# Patient Record
Sex: MALE | Race: WHITE | Employment: FULL TIME
[De-identification: names, ages, dates, MRNs, and addresses within clinical notes are randomized per-mention and may not be internally consistent; named-entity substitution may affect disease eponyms.]

---

## 2020-03-02 ENCOUNTER — HEALTH MAINTENANCE LETTER (OUTPATIENT)
Age: 30
End: 2020-03-02

## 2020-12-20 ENCOUNTER — HEALTH MAINTENANCE LETTER (OUTPATIENT)
Age: 30
End: 2020-12-20

## 2021-04-24 ENCOUNTER — HEALTH MAINTENANCE LETTER (OUTPATIENT)
Age: 31
End: 2021-04-24

## 2021-05-24 ENCOUNTER — CONVERSION ENCOUNTER (OUTPATIENT)
Dept: FAMILY MEDICINE CLINIC | Facility: CLINIC | Age: 31
End: 2021-05-24

## 2021-05-24 ENCOUNTER — OFFICE VISIT CONVERTED (OUTPATIENT)
Dept: FAMILY MEDICINE CLINIC | Facility: CLINIC | Age: 31
End: 2021-05-24
Attending: FAMILY MEDICINE

## 2021-05-24 ENCOUNTER — HOSPITAL ENCOUNTER (OUTPATIENT)
Dept: FAMILY MEDICINE CLINIC | Facility: CLINIC | Age: 31
Discharge: HOME | End: 2021-05-24
Attending: FAMILY MEDICINE

## 2021-05-24 LAB
ALBUMIN SERPL-MCNC: 4.6 G/DL (ref 3.5–5)
ALBUMIN/GLOB SERPL: 1.6 {RATIO} (ref 1.4–2.6)
ALP SERPL-CCNC: 82 U/L (ref 53–128)
ALT SERPL-CCNC: 26 U/L (ref 10–40)
ANION GAP SERPL CALC-SCNC: 15 MMOL/L (ref 8–19)
AST SERPL-CCNC: 21 U/L (ref 15–50)
BASOPHILS # BLD AUTO: 0.08 10*3/UL (ref 0–0.2)
BASOPHILS NFR BLD AUTO: 1.1 % (ref 0–3)
BILIRUB SERPL-MCNC: 0.65 MG/DL (ref 0.2–1.3)
BUN SERPL-MCNC: 15 MG/DL (ref 5–25)
BUN/CREAT SERPL: 14 {RATIO} (ref 6–20)
CALCIUM SERPL-MCNC: 9.2 MG/DL (ref 8.7–10.4)
CHLORIDE SERPL-SCNC: 105 MMOL/L (ref 99–111)
CHOLEST SERPL-MCNC: 231 MG/DL (ref 107–200)
CHOLEST/HDLC SERPL: 6.4 {RATIO} (ref 3–6)
CONV ABS IMM GRAN: 0.03 10*3/UL (ref 0–0.2)
CONV CO2: 26 MMOL/L (ref 22–32)
CONV IMMATURE GRAN: 0.4 % (ref 0–1.8)
CONV TOTAL PROTEIN: 7.5 G/DL (ref 6.3–8.2)
CREAT UR-MCNC: 1.1 MG/DL (ref 0.7–1.2)
DEPRECATED RDW RBC AUTO: 41.3 FL (ref 35.1–43.9)
EOSINOPHIL # BLD AUTO: 0.15 10*3/UL (ref 0–0.7)
EOSINOPHIL # BLD AUTO: 2.1 % (ref 0–7)
ERYTHROCYTE [DISTWIDTH] IN BLOOD BY AUTOMATED COUNT: 12.8 % (ref 11.6–14.4)
GFR SERPLBLD BASED ON 1.73 SQ M-ARVRAT: >60 ML/MIN/{1.73_M2}
GLOBULIN UR ELPH-MCNC: 2.9 G/DL (ref 2–3.5)
GLUCOSE SERPL-MCNC: 97 MG/DL (ref 70–99)
HCT VFR BLD AUTO: 48.3 % (ref 42–52)
HDLC SERPL-MCNC: 36 MG/DL (ref 40–60)
HGB BLD-MCNC: 15.7 G/DL (ref 14–18)
INTERNATIONAL NORMALIZED RATIO (INR) RANGE: NORMAL
LDLC SERPL CALC-MCNC: 169 MG/DL (ref 70–100)
LYMPHOCYTES # BLD AUTO: 2.4 10*3/UL (ref 1–5)
LYMPHOCYTES NFR BLD AUTO: 33.5 % (ref 20–45)
MCH RBC QN AUTO: 28.9 PG (ref 27–31)
MCHC RBC AUTO-ENTMCNC: 32.5 G/DL (ref 33–37)
MCV RBC AUTO: 88.8 FL (ref 80–96)
MONOCYTES # BLD AUTO: 0.67 10*3/UL (ref 0.2–1.2)
MONOCYTES NFR BLD AUTO: 9.3 % (ref 3–10)
NEUTROPHILS # BLD AUTO: 3.84 10*3/UL (ref 2–8)
NEUTROPHILS NFR BLD AUTO: 53.6 % (ref 30–85)
NRBC CBCN: 0 % (ref 0–0.7)
OSMOLALITY SERPL CALC.SUM OF ELEC: 293 MOSM/KG (ref 273–304)
PLATELET # BLD AUTO: 269 10*3/UL (ref 130–400)
PMV BLD AUTO: 10.2 FL (ref 9.4–12.4)
POTASSIUM SERPL-SCNC: 4.5 MMOL/L (ref 3.5–5.3)
RBC # BLD AUTO: 5.44 10*6/UL (ref 4.7–6.1)
SODIUM SERPL-SCNC: 141 MMOL/L (ref 135–147)
TRIGL SERPL-MCNC: 129 MG/DL (ref 40–150)
VLDLC SERPL-MCNC: 26 MG/DL (ref 5–37)
WBC # BLD AUTO: 7.17 10*3/UL (ref 4.8–10.8)
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
## 2021-07-15 VITALS
TEMPERATURE: 97.7 F | HEART RATE: 79 BPM | HEIGHT: 75 IN | DIASTOLIC BLOOD PRESSURE: 78 MMHG | WEIGHT: 315 LBS | OXYGEN SATURATION: 96 % | BODY MASS INDEX: 39.17 KG/M2 | RESPIRATION RATE: 12 BRPM | SYSTOLIC BLOOD PRESSURE: 110 MMHG

## 2021-10-03 ENCOUNTER — HEALTH MAINTENANCE LETTER (OUTPATIENT)
Age: 31
End: 2021-10-03

## 2022-05-15 ENCOUNTER — HEALTH MAINTENANCE LETTER (OUTPATIENT)
Age: 32
End: 2022-05-15

## 2022-06-10 RX ORDER — WARFARIN SODIUM 1 MG/1
TABLET ORAL
Qty: 30 TABLET | OUTPATIENT
Start: 2022-06-10

## 2022-09-10 ENCOUNTER — HEALTH MAINTENANCE LETTER (OUTPATIENT)
Age: 32
End: 2022-09-10

## 2023-02-14 ENCOUNTER — TRANSCRIBE ORDERS (OUTPATIENT)
Dept: ADMINISTRATIVE | Facility: HOSPITAL | Age: 33
End: 2023-02-14
Payer: COMMERCIAL

## 2023-02-14 ENCOUNTER — LAB (OUTPATIENT)
Dept: LAB | Facility: HOSPITAL | Age: 33
End: 2023-02-14
Payer: COMMERCIAL

## 2023-02-14 DIAGNOSIS — D68.2 HEREDITARY COAGULATION FACTOR DEFICIENCY: ICD-10-CM

## 2023-02-14 DIAGNOSIS — Z79.01 LONG TERM (CURRENT) USE OF ANTICOAGULANTS: ICD-10-CM

## 2023-02-14 DIAGNOSIS — Z79.01 LONG TERM (CURRENT) USE OF ANTICOAGULANTS: Primary | ICD-10-CM

## 2023-02-14 LAB
INR PPP: 1.77 (ref 0.86–1.15)
PROTHROMBIN TIME: 21 SECONDS (ref 11.8–14.9)

## 2023-02-14 PROCEDURE — 85610 PROTHROMBIN TIME: CPT

## 2023-02-14 PROCEDURE — 36415 COLL VENOUS BLD VENIPUNCTURE: CPT

## 2023-03-01 ENCOUNTER — TRANSCRIBE ORDERS (OUTPATIENT)
Dept: ADMINISTRATIVE | Facility: HOSPITAL | Age: 33
End: 2023-03-01
Payer: COMMERCIAL

## 2023-03-01 ENCOUNTER — LAB (OUTPATIENT)
Dept: LAB | Facility: HOSPITAL | Age: 33
End: 2023-03-01
Payer: COMMERCIAL

## 2023-03-01 DIAGNOSIS — Z79.01 LONG TERM (CURRENT) USE OF ANTICOAGULANTS: Primary | ICD-10-CM

## 2023-03-01 DIAGNOSIS — Z79.01 LONG TERM (CURRENT) USE OF ANTICOAGULANTS: ICD-10-CM

## 2023-03-01 DIAGNOSIS — D68.2 HEREDITARY COAGULATION FACTOR DEFICIENCY: ICD-10-CM

## 2023-03-01 LAB
INR PPP: 1.42 (ref 0.86–1.15)
PROTHROMBIN TIME: 17.3 SECONDS (ref 11.8–14.9)

## 2023-03-01 PROCEDURE — 85610 PROTHROMBIN TIME: CPT

## 2023-03-01 PROCEDURE — 36415 COLL VENOUS BLD VENIPUNCTURE: CPT

## 2023-03-09 ENCOUNTER — LAB (OUTPATIENT)
Dept: LAB | Facility: HOSPITAL | Age: 33
End: 2023-03-09
Payer: COMMERCIAL

## 2023-03-09 DIAGNOSIS — Z79.01 LONG TERM (CURRENT) USE OF ANTICOAGULANTS: ICD-10-CM

## 2023-03-09 DIAGNOSIS — D68.2 HEREDITARY COAGULATION FACTOR DEFICIENCY: ICD-10-CM

## 2023-03-09 LAB
INR PPP: 2.19 (ref 0.86–1.15)
PROTHROMBIN TIME: 24.1 SECONDS (ref 11.8–14.9)

## 2023-03-09 PROCEDURE — 85610 PROTHROMBIN TIME: CPT

## 2023-03-09 PROCEDURE — 36415 COLL VENOUS BLD VENIPUNCTURE: CPT

## 2023-03-16 ENCOUNTER — LAB (OUTPATIENT)
Dept: LAB | Facility: HOSPITAL | Age: 33
End: 2023-03-16
Payer: COMMERCIAL

## 2023-03-16 DIAGNOSIS — Z79.01 LONG TERM (CURRENT) USE OF ANTICOAGULANTS: ICD-10-CM

## 2023-03-16 DIAGNOSIS — D68.2 HEREDITARY COAGULATION FACTOR DEFICIENCY: ICD-10-CM

## 2023-03-16 LAB
INR PPP: 1.85 (ref 0.86–1.15)
PROTHROMBIN TIME: 21.2 SECONDS (ref 11.8–14.9)

## 2023-03-16 PROCEDURE — 85610 PROTHROMBIN TIME: CPT

## 2023-03-16 PROCEDURE — 36415 COLL VENOUS BLD VENIPUNCTURE: CPT

## 2023-03-24 ENCOUNTER — LAB (OUTPATIENT)
Dept: LAB | Facility: HOSPITAL | Age: 33
End: 2023-03-24
Payer: COMMERCIAL

## 2023-03-24 DIAGNOSIS — D68.2 HEREDITARY COAGULATION FACTOR DEFICIENCY: ICD-10-CM

## 2023-03-24 DIAGNOSIS — Z79.01 LONG TERM (CURRENT) USE OF ANTICOAGULANTS: ICD-10-CM

## 2023-03-24 LAB
INR PPP: 2.51 (ref 0.86–1.15)
PROTHROMBIN TIME: 26.7 SECONDS (ref 11.8–14.9)

## 2023-03-24 PROCEDURE — 36415 COLL VENOUS BLD VENIPUNCTURE: CPT

## 2023-03-24 PROCEDURE — 85610 PROTHROMBIN TIME: CPT

## 2023-04-07 ENCOUNTER — LAB (OUTPATIENT)
Dept: LAB | Facility: HOSPITAL | Age: 33
End: 2023-04-07
Payer: COMMERCIAL

## 2023-04-07 ENCOUNTER — TRANSCRIBE ORDERS (OUTPATIENT)
Dept: ADMINISTRATIVE | Facility: HOSPITAL | Age: 33
End: 2023-04-07
Payer: COMMERCIAL

## 2023-04-07 DIAGNOSIS — D68.2 HEREDITARY COAGULATION FACTOR DEFICIENCY: ICD-10-CM

## 2023-04-07 DIAGNOSIS — Z79.01 LONG TERM (CURRENT) USE OF ANTICOAGULANTS: Primary | ICD-10-CM

## 2023-04-07 DIAGNOSIS — Z79.01 LONG TERM (CURRENT) USE OF ANTICOAGULANTS: ICD-10-CM

## 2023-04-07 LAB
INR PPP: 2.43 (ref 0.86–1.15)
PROTHROMBIN TIME: 26.1 SECONDS (ref 11.8–14.9)

## 2023-04-07 PROCEDURE — 85610 PROTHROMBIN TIME: CPT

## 2023-04-07 PROCEDURE — 36415 COLL VENOUS BLD VENIPUNCTURE: CPT

## 2023-06-03 ENCOUNTER — HEALTH MAINTENANCE LETTER (OUTPATIENT)
Age: 33
End: 2023-06-03

## 2023-10-19 ENCOUNTER — OFFICE VISIT (OUTPATIENT)
Dept: FAMILY MEDICINE CLINIC | Facility: CLINIC | Age: 33
End: 2023-10-19
Payer: COMMERCIAL

## 2023-10-19 VITALS
OXYGEN SATURATION: 97 % | BODY MASS INDEX: 39.17 KG/M2 | WEIGHT: 315 LBS | SYSTOLIC BLOOD PRESSURE: 110 MMHG | TEMPERATURE: 97.5 F | DIASTOLIC BLOOD PRESSURE: 80 MMHG | HEART RATE: 82 BPM | HEIGHT: 75 IN

## 2023-10-19 DIAGNOSIS — Z30.09 VASECTOMY EVALUATION: ICD-10-CM

## 2023-10-19 DIAGNOSIS — R19.7 DIARRHEA, UNSPECIFIED TYPE: ICD-10-CM

## 2023-10-19 DIAGNOSIS — R22.30 NODULE OF SKIN OF HAND: Primary | ICD-10-CM

## 2023-10-19 PROBLEM — Z79.01 CURRENT USE OF LONG TERM ANTICOAGULATION: Status: ACTIVE | Noted: 2021-06-30

## 2023-10-19 PROBLEM — D68.51 FACTOR 5 LEIDEN MUTATION, HETEROZYGOUS: Status: ACTIVE | Noted: 2023-10-19

## 2023-10-19 PROBLEM — S62.109A FRACTURE OF CARPAL BONE: Status: ACTIVE | Noted: 2023-10-19

## 2023-10-19 PROBLEM — S72.009A HIP FRACTURE: Status: ACTIVE | Noted: 2023-10-19

## 2023-10-19 PROBLEM — I26.99 PULMONARY EMBOLISM: Status: ACTIVE | Noted: 2023-10-19

## 2023-10-19 PROCEDURE — 86258 DGP ANTIBODY EACH IG CLASS: CPT | Performed by: FAMILY MEDICINE

## 2023-10-19 PROCEDURE — 86364 TISS TRNSGLTMNASE EA IG CLAS: CPT | Performed by: FAMILY MEDICINE

## 2023-10-19 PROCEDURE — 82784 ASSAY IGA/IGD/IGG/IGM EACH: CPT | Performed by: FAMILY MEDICINE

## 2023-10-19 RX ORDER — WARFARIN SODIUM 1 MG/1
TABLET ORAL SEE ADMIN INSTRUCTIONS
COMMUNITY
Start: 2021-05-24

## 2023-10-19 RX ORDER — WARFARIN SODIUM 10 MG/1
10 TABLET ORAL DAILY
COMMUNITY
Start: 2021-05-24

## 2023-10-19 NOTE — PROGRESS NOTES
"Chief Complaint  Hand Pain (Knot on hand ) and Sterilization (Pt would like referral for vasectomy)    Subjective          Morris Pham presents to [facility]  History of Present Illness  Pt has nodule right hand x 2 months- has some pain with movement- nodule is between #3,4 metacarpals    Pt has some food allergies- has intermittent diarrhea after certain foods- pt has had symptoms more than 1 yr    Pt needs vasectomy             Objective   No Known Allergies  Immunization History   Administered Date(s) Administered    COVID-19 (PFIZER) Purple Cap Monovalent 04/14/2021, 05/05/2021     History reviewed. No pertinent past medical history.   History reviewed. No pertinent surgical history.   Social History     Socioeconomic History    Marital status:    Tobacco Use    Smoking status: Never    Smokeless tobacco: Never   Vaping Use    Vaping Use: Never used   Substance and Sexual Activity    Alcohol use: Never    Drug use: Never    Sexual activity: Defer        Current Outpatient Medications:     warfarin (COUMADIN) 1 MG tablet, See Admin Instructions., Disp: , Rfl:     warfarin (COUMADIN) 10 MG tablet, Take 1 tablet by mouth Daily., Disp: , Rfl:    History reviewed. No pertinent family history.       Vital Signs:   Vitals:    10/19/23 0827   BP: 110/80   Pulse: 82   Temp: 97.5 °F (36.4 °C)   SpO2: 97%   Weight: (!) 152 kg (336 lb)   Height: 189.2 cm (74.5\")       Review of Systems   Physical Exam  Vitals reviewed.   Constitutional:       Appearance: Normal appearance. He is well-developed.   HENT:      Head: Normocephalic and atraumatic.      Right Ear: External ear normal.      Left Ear: External ear normal.      Mouth/Throat:      Pharynx: No oropharyngeal exudate.   Eyes:      Conjunctiva/sclera: Conjunctivae normal.      Pupils: Pupils are equal, round, and reactive to light.   Cardiovascular:      Rate and Rhythm: Normal rate and regular rhythm.      Pulses: Normal " pulses.      Heart sounds: Normal heart sounds. No murmur heard.     No friction rub. No gallop.   Pulmonary:      Effort: Pulmonary effort is normal.      Breath sounds: Normal breath sounds. No wheezing or rhonchi.   Abdominal:      General: Abdomen is flat. Bowel sounds are normal. There is no distension.      Palpations: Abdomen is soft. There is no mass.      Tenderness: There is no abdominal tenderness. There is no guarding or rebound.      Hernia: No hernia is present.   Musculoskeletal:         General: Normal range of motion.      Comments: Right hand 4-5mm soft, moveable, non-tender nodule in palm of hand between metacarpal #3,4, no redness or warmth.   Skin:     General: Skin is warm and dry.      Capillary Refill: Capillary refill takes less than 2 seconds.   Neurological:      General: No focal deficit present.      Mental Status: He is alert and oriented to person, place, and time.      Cranial Nerves: No cranial nerve deficit.   Psychiatric:         Mood and Affect: Mood and affect normal.         Behavior: Behavior normal.         Thought Content: Thought content normal.         Judgment: Judgment normal.        Result Review :   The following data was reviewed by: Elliott Vidal MD on 10/19/2023:    CBC          2/8/2023    16:20   CBC   WBC 8.71       RBC 5.19       Hemoglobin 15.4       Hematocrit 45.6       MCV 87.9       MCH 29.7       MCHC 33.8       RDW 13.0       Platelets 256          Details          This result is from an external source.                 Data reviewed : Radiologic studies I viewed and interpreted 3 views right hand x-rays:no fxs           Assessment and Plan    Diagnoses and all orders for this visit:    1. Nodule of skin of hand (Primary)  -     XR Hand 3+ View Right (In Office)  -     Ambulatory Referral to Hand Surgery    2. Diarrhea, unspecified type  -     Celiac Disease Antibody Screen  -     Ambulatory Referral to Allergy  -     Ambulatory Referral to  Gastroenterology    3. Vasectomy evaluation  -     Ambulatory Referral to Urology            Follow Up   Return if symptoms worsen or fail to improve.  Patient was given instructions and counseling regarding his condition or for health maintenance advice. Please see specific information pulled into the AVS if appropriate.

## 2023-10-19 NOTE — PROGRESS NOTES
Venipuncture Blood Specimen Collection  Venipuncture performed in left arm by Shade Abdalla with good hemostasis. Patient tolerated the procedure well without complications.   10/19/23   Shade Abdalla

## 2023-10-20 LAB
GLIADIN PEPTIDE IGA SER-ACNC: 4 UNITS (ref 0–19)
IGA SERPL-MCNC: 79 MG/DL (ref 90–386)
TTG IGA SER-ACNC: <2 U/ML (ref 0–3)

## 2024-03-18 ENCOUNTER — OFFICE VISIT (OUTPATIENT)
Dept: FAMILY MEDICINE CLINIC | Facility: CLINIC | Age: 34
End: 2024-03-18
Payer: COMMERCIAL

## 2024-03-18 VITALS
OXYGEN SATURATION: 97 % | TEMPERATURE: 97.1 F | DIASTOLIC BLOOD PRESSURE: 90 MMHG | SYSTOLIC BLOOD PRESSURE: 150 MMHG | BODY MASS INDEX: 43.47 KG/M2 | HEART RATE: 79 BPM | WEIGHT: 315 LBS

## 2024-03-18 DIAGNOSIS — R16.0 LIVER MASS, RIGHT LOBE: Primary | ICD-10-CM

## 2024-03-18 DIAGNOSIS — R19.7 DIARRHEA, UNSPECIFIED TYPE: ICD-10-CM

## 2024-03-18 DIAGNOSIS — K76.0 FATTY LIVER: ICD-10-CM

## 2024-03-18 DIAGNOSIS — E78.2 MIXED HYPERLIPIDEMIA: ICD-10-CM

## 2024-03-18 DIAGNOSIS — I10 PRIMARY HYPERTENSION: ICD-10-CM

## 2024-03-18 PROCEDURE — 99214 OFFICE O/P EST MOD 30 MIN: CPT | Performed by: FAMILY MEDICINE

## 2024-03-18 RX ORDER — LISINOPRIL 10 MG/1
10 TABLET ORAL DAILY
Qty: 30 TABLET | Refills: 1 | Status: SHIPPED | OUTPATIENT
Start: 2024-03-18 | End: 2024-03-19

## 2024-03-18 NOTE — PROGRESS NOTES
Chief Complaint  Annual Exam, Factor 5, and Fatty Liver     Subjective          Morris Pham presents to [facility]  History of Present Illness  Pt had U/S recently that showed right complex liver mass and fatty liver- will need MRI of liver  Pt has chronic diarrhea  Hypertension  This is a new problem. The current episode started today. The problem is unchanged. The problem is uncontrolled. Pertinent negatives include no anxiety, blurred vision, chest pain, headaches, malaise/fatigue, neck pain, orthopnea, palpitations, peripheral edema, PND, shortness of breath or sweats. There are no associated agents to hypertension. Risk factors for coronary artery disease include male gender and family history. Current antihypertension treatment includes ACE inhibitors. The current treatment provides no improvement. There are no compliance problems.               Objective   No Known Allergies  Immunization History   Administered Date(s) Administered    COVID-19 (PFIZER) Purple Cap Monovalent 04/14/2021, 05/05/2021     History reviewed. No pertinent past medical history.   History reviewed. No pertinent surgical history.   Social History     Socioeconomic History    Marital status:    Tobacco Use    Smoking status: Never    Smokeless tobacco: Never   Vaping Use    Vaping status: Never Used   Substance and Sexual Activity    Alcohol use: Never    Drug use: Never    Sexual activity: Defer        Current Outpatient Medications:     warfarin (COUMADIN) 1 MG tablet, See Admin Instructions., Disp: , Rfl:     warfarin (COUMADIN) 10 MG tablet, Take 1 tablet by mouth Daily., Disp: , Rfl:     lisinopril (PRINIVIL,ZESTRIL) 10 MG tablet, Take 1 tablet by mouth Daily., Disp: 30 tablet, Rfl: 1   History reviewed. No pertinent family history.       Vital Signs:   Vitals:    03/18/24 1631   BP: 150/90   Pulse: 79   Temp: 97.1 °F (36.2 °C)   SpO2: 97%   Weight: (!) 156 kg (343 lb 3.2 oz)       Review  of Systems   Constitutional:  Negative for fatigue, fever and malaise/fatigue.   HENT:  Negative for sore throat.    Eyes:  Negative for blurred vision and visual disturbance.   Respiratory:  Negative for cough, chest tightness, shortness of breath and wheezing.    Cardiovascular:  Negative for chest pain, palpitations, orthopnea, leg swelling and PND.   Gastrointestinal:  Negative for abdominal pain, diarrhea, nausea and vomiting.   Musculoskeletal:  Negative for neck pain.   Neurological:  Negative for light-headedness and headaches.      Physical Exam  Vitals reviewed.   Constitutional:       Appearance: Normal appearance. He is well-developed.   HENT:      Head: Normocephalic and atraumatic.      Right Ear: External ear normal.      Left Ear: External ear normal.      Mouth/Throat:      Pharynx: No oropharyngeal exudate.   Eyes:      Conjunctiva/sclera: Conjunctivae normal.      Pupils: Pupils are equal, round, and reactive to light.   Cardiovascular:      Rate and Rhythm: Normal rate and regular rhythm.      Pulses: Normal pulses.      Heart sounds: Normal heart sounds. No murmur heard.     No friction rub. No gallop.   Pulmonary:      Effort: Pulmonary effort is normal.      Breath sounds: Normal breath sounds. No wheezing or rhonchi.   Abdominal:      General: Abdomen is flat. Bowel sounds are normal. There is no distension.      Palpations: Abdomen is soft. There is no mass.      Tenderness: There is no abdominal tenderness. There is no guarding or rebound.      Hernia: No hernia is present.   Musculoskeletal:         General: Normal range of motion.   Skin:     General: Skin is warm and dry.      Capillary Refill: Capillary refill takes less than 2 seconds.   Neurological:      General: No focal deficit present.      Mental Status: He is alert and oriented to person, place, and time.      Cranial Nerves: No cranial nerve deficit.   Psychiatric:         Mood and Affect: Mood and affect normal.          Behavior: Behavior normal.         Thought Content: Thought content normal.         Judgment: Judgment normal.        Result Review :   The following data was reviewed by: Elliott Vidal MD on 03/18/2024:    CBC          11/8/2023    16:15   CBC   WBC 8.52       RBC 5.19       Hemoglobin 15.4       Hematocrit 44.8       MCV 86.3       MCH 29.7       MCHC 34.4       RDW Test Not Performed       Platelets 262          Details          This result is from an external source.                           Assessment and Plan    Diagnoses and all orders for this visit:    1. Liver mass, right lobe (Primary)  -     Ambulatory Referral to Gastroenterology  -     MRI Abdomen With & Without Contrast; Future    2. Diarrhea, unspecified type  -     Ambulatory Referral to Gastroenterology    3. Fatty liver  -     Ambulatory Referral to Gastroenterology    4. Mixed hyperlipidemia    5. Primary hypertension  -     CBC Auto Differential  -     Comprehensive Metabolic Panel  -     Lipid Panel  -     TSH+Free T4  -     lisinopril (PRINIVIL,ZESTRIL) 10 MG tablet; Take 1 tablet by mouth Daily.  Dispense: 30 tablet; Refill: 1            Follow Up   Return in about 3 weeks (around 4/8/2024) for Recheck.  Patient was given instructions and counseling regarding his condition or for health maintenance advice. Please see specific information pulled into the AVS if appropriate.

## 2024-03-19 RX ORDER — LISINOPRIL 10 MG/1
10 TABLET ORAL DAILY
Qty: 90 TABLET | Refills: 0 | Status: SHIPPED | OUTPATIENT
Start: 2024-03-19

## 2024-03-21 ENCOUNTER — TELEPHONE (OUTPATIENT)
Dept: FAMILY MEDICINE CLINIC | Facility: CLINIC | Age: 34
End: 2024-03-21

## 2024-03-21 NOTE — TELEPHONE ENCOUNTER
Caller: Morris Pham    Relationship: Self    Best call back number: [phone]     What is the best time to reach you: ANYTIME     Who are you requesting to speak with (clinical staff, provider,  specific staff member): CLINICAL     What was the call regarding: PATIENT IS CALLING IN REGARDS TO FASTING LABS, THAT HE WAS THINKING WAS TO BE ORDERED BY PCP, TO HAVE COMPLETED ASAP.

## 2024-03-26 ENCOUNTER — OFFICE VISIT (OUTPATIENT)
Dept: GASTROENTEROLOGY | Facility: CLINIC | Age: 34
End: 2024-03-26
Payer: COMMERCIAL

## 2024-03-26 VITALS
WEIGHT: 315 LBS | BODY MASS INDEX: 40.43 KG/M2 | HEIGHT: 74 IN | SYSTOLIC BLOOD PRESSURE: 136 MMHG | DIASTOLIC BLOOD PRESSURE: 84 MMHG | HEART RATE: 63 BPM | OXYGEN SATURATION: 100 %

## 2024-03-26 DIAGNOSIS — K76.9 LIVER LESION: Primary | ICD-10-CM

## 2024-03-26 DIAGNOSIS — K76.0 FATTY LIVER: ICD-10-CM

## 2024-03-26 DIAGNOSIS — R19.7 DIARRHEA, UNSPECIFIED TYPE: ICD-10-CM

## 2024-03-26 DIAGNOSIS — E66.01 CLASS 3 SEVERE OBESITY WITH BODY MASS INDEX (BMI) OF 40.0 TO 44.9 IN ADULT, UNSPECIFIED OBESITY TYPE, UNSPECIFIED WHETHER SERIOUS COMORBIDITY PRESENT: ICD-10-CM

## 2024-03-26 PROCEDURE — 99204 OFFICE O/P NEW MOD 45 MIN: CPT

## 2024-03-26 RX ORDER — SODIUM, POTASSIUM,MAG SULFATES 17.5-3.13G
1 SOLUTION, RECONSTITUTED, ORAL ORAL EVERY 12 HOURS
Qty: 354 ML | Refills: 0 | Status: SHIPPED | OUTPATIENT
Start: 2024-03-26

## 2024-03-26 NOTE — PROGRESS NOTES
"Chief Complaint  Fatty liver and Diarrhea    Morris Pham is a 33 y.o. male who presents to [facility] on referral from Elliott Vidal MD for a gastroenterology evaluation of fatty liver and diarrhea.      History of Present Illness  New patient presents to the office for fatty liver, liver lesion, and diarrhea. Patient had ultrasound at work showing fatty liver with liver lesion, follow up MRI ordered by PCP. Denies RUQ pain, alcohol use, IV drug use, unprofessional tattoos, history of or exposure to hepatitis, history of blood transfusions, and family history of liver disease.  He does struggle with intermittent diarrhea, has noticed some certain food triggers.  Has diarrhea 50-60% of the time, will have 1-2 episodes in a day.  Denies abdominal pain, melena, and hematochezia.  Denies upper GI symptoms.      History reviewed. No pertinent past medical history.    History reviewed. No pertinent surgical history.      Current Outpatient Medications:     warfarin (COUMADIN) 1 MG tablet, See Admin Instructions., Disp: , Rfl:     warfarin (COUMADIN) 10 MG tablet, Take 1 tablet by mouth Daily., Disp: , Rfl:     lisinopril (PRINIVIL,ZESTRIL) 10 MG tablet, TAKE 1 TABLET BY MOUTH DAILY (Patient not taking: Reported on 3/26/2024), Disp: 90 tablet, Rfl: 0    sodium-potassium-magnesium sulfates (SUPREP) 17.5-3.13-1.6 GM/177ML solution oral solution, Take 1 bottle by mouth Every 12 (Twelve) Hours., Disp: 354 mL, Rfl: 0     No Known Allergies    Family History   Problem Relation Age of Onset    Colon cancer Neg Hx         Social History     Social History Narrative    Not on file       Immunization:  Immunization History   Administered Date(s) Administered    COVID-19 (PFIZER) Purple Cap Monovalent 04/14/2021, 05/05/2021        Objective     Vital Signs:   /84 (BP Location: Right arm, Patient Position: Sitting, Cuff Size: Adult)   Pulse 63   Ht 189.2 cm (74.49\")   Wt " (!) 153 kg (338 lb)   SpO2 100%   BMI 42.83 kg/m²       Physical Exam  Constitutional:       Appearance: Normal appearance. He is normal weight.   HENT:      Head: Normocephalic and atraumatic.      Nose: Nose normal.   Pulmonary:      Effort: Pulmonary effort is normal.   Skin:     General: Skin is warm and dry.   Neurological:      Mental Status: He is alert and oriented to person, place, and time. Mental status is at baseline.   Psychiatric:         Mood and Affect: Mood normal.         Behavior: Behavior normal.         Thought Content: Thought content normal.         Judgment: Judgment normal.         Result Review :                     Assessment and Plan    Diagnoses and all orders for this visit:    1. Liver lesion (Primary)  -     AFP Tumor Marker; Future    2. Fatty liver    3. Class 3 severe obesity with body mass index (BMI) of 40.0 to 44.9 in adult, unspecified obesity type, unspecified whether serious comorbidity present    4. Diarrhea, unspecified type  -     Case Request; Standing  -     Follow Anesthesia Guidelines / Protocol; Standing  -     Verify NPO; Standing  -     Verify Bowel Prep Was Successful; Standing  -     Give Tap Water Enema If Bowel Prep Insufficient; Standing  -     Obtain Informed Consent; Standing  -     Case Request    Other orders  -     sodium-potassium-magnesium sulfates (SUPREP) 17.5-3.13-1.6 GM/177ML solution oral solution; Take 1 bottle by mouth Every 12 (Twelve) Hours.  Dispense: 354 mL; Refill: 0    33 year old new patient presents to the office for fatty liver, liver lesion, and diarrhea. Patient had ultrasound at work showing fatty liver with liver lesion, follow up MRI ordered by PCP.  Patient will complete labs to assess liver enzymes and AFP tumor marker.  Due to intermittent diarrhea patient will proceed with colonoscopy for further evaluation.  Blood thinner clearance from Dr. Cates.  Patient will add daily fiber supplement and probiotic to help regulate bowel  habits.  Patient is agreeable to plan will call office any questions or concerns.    COLONOSCOPY Surgical Risk and Benefits: Possible risk/complications, benefits, and alternatives to surgical or invasive procedure have been explained to patient and/or legal guardian. Risks include bleeding, infection, and perforation. Patient has been evaluated and can tolerate anesthesia and/or sedation. Risk, benefits, and alternatives to anesthesia and sedation have been explained to patient and/or legal guardian.     Follow Up   No follow-ups on file.  Patient was given instructions and counseling regarding his condition or for health maintenance advice. Please see specific information pulled into the AVS if appropriate.

## 2024-03-27 ENCOUNTER — TELEPHONE (OUTPATIENT)
Dept: GASTROENTEROLOGY | Facility: CLINIC | Age: 34
End: 2024-03-27
Payer: COMMERCIAL

## 2024-03-27 NOTE — TELEPHONE ENCOUNTER
Received call from Zara @ [facility] stating patient is no longer fallowed by Dr Pedro Cates. He is now followed by Dr SEBASTIÁN Caballero and the Clearance request needs to be faxed to that office @ [phone]. Information forwarded to referral coordinator Erin for follow up

## 2024-03-28 ENCOUNTER — PATIENT ROUNDING (BHMG ONLY) (OUTPATIENT)
Dept: GASTROENTEROLOGY | Facility: CLINIC | Age: 34
End: 2024-03-28
Payer: COMMERCIAL

## 2024-03-28 NOTE — PROGRESS NOTES
3/28/2024      Hello, may I speak with Morris BELEM Pham     My name is Emily. I am calling from [facility] [place]. I show that you had a recent visit with JATINDER Luna.    Before we get started may I verify your date of birth? 1990    I am calling to officially welcome you to our practice and ask about your recent visit. Is this a good time to talk? Left Voicemail, Ok per EDUAR    Tell me about your visit with us. What things went well?    We strive to ensure that we protect your safety and privacy. Is there anything we could have done to improve this during your visit?        We're always looking for ways to make our patients' experiences even better. Do you have recommendations on ways we may improve?    Overall were you satisfied with your first visit to our practice?    I appreciate you taking the time to speak with me today. Is there anything else I can do for you?    I am glad to hear that you had a very good visit and I appreciate you taking the time to provide feedback on this call. We would greatly appreciate you filling out a survey if you receive one in the mail, email or text. This is a great opportunity to provide any additional feedback that you may think of after this call as well.       Thank you, and have a great day.

## 2024-04-02 ENCOUNTER — LAB (OUTPATIENT)
Dept: LAB | Facility: HOSPITAL | Age: 34
End: 2024-04-02
Payer: COMMERCIAL

## 2024-04-02 ENCOUNTER — HOSPITAL ENCOUNTER (OUTPATIENT)
Dept: MRI IMAGING | Facility: HOSPITAL | Age: 34
Discharge: HOME | End: 2024-04-02
Payer: COMMERCIAL

## 2024-04-02 DIAGNOSIS — K76.9 LIVER LESION: ICD-10-CM

## 2024-04-02 DIAGNOSIS — R16.0 LIVER MASS, RIGHT LOBE: ICD-10-CM

## 2024-04-02 LAB
ALBUMIN SERPL-MCNC: 4.3 G/DL (ref 3.5–5.2)
ALBUMIN/GLOB SERPL: 1.8 G/DL
ALP SERPL-CCNC: 85 U/L (ref 39–117)
ALPHA-FETOPROTEIN: <2 NG/ML (ref 0–8.3)
ALT SERPL W P-5'-P-CCNC: 26 U/L (ref 1–41)
ANION GAP SERPL CALCULATED.3IONS-SCNC: 9.5 MMOL/L (ref 5–15)
AST SERPL-CCNC: 22 U/L (ref 1–40)
BASOPHILS # BLD AUTO: 0.07 10*3/MM3 (ref 0–0.2)
BASOPHILS NFR BLD AUTO: 0.8 % (ref 0–1.5)
BILIRUB SERPL-MCNC: 0.4 MG/DL (ref 0–1.2)
BUN SERPL-MCNC: 15 MG/DL (ref 6–20)
BUN/CREAT SERPL: 15.2 (ref 7–25)
CALCIUM SPEC-SCNC: 9 MG/DL (ref 8.6–10.5)
CHLORIDE SERPL-SCNC: 106 MMOL/L (ref 98–107)
CHOLEST SERPL-MCNC: 220 MG/DL (ref 0–200)
CO2 SERPL-SCNC: 25.5 MMOL/L (ref 22–29)
CREAT SERPL-MCNC: 0.99 MG/DL (ref 0.76–1.27)
DEPRECATED RDW RBC AUTO: 41.5 FL (ref 37–54)
EGFRCR SERPLBLD CKD-EPI 2021: 102.5 ML/MIN/1.73
EOSINOPHIL # BLD AUTO: 0.27 10*3/MM3 (ref 0–0.4)
EOSINOPHIL NFR BLD AUTO: 3.1 % (ref 0.3–6.2)
ERYTHROCYTE [DISTWIDTH] IN BLOOD BY AUTOMATED COUNT: 12.8 % (ref 12.3–15.4)
GLOBULIN UR ELPH-MCNC: 2.4 GM/DL
GLUCOSE SERPL-MCNC: 97 MG/DL (ref 65–99)
HCT VFR BLD AUTO: 46.5 % (ref 37.5–51)
HDLC SERPL-MCNC: 37 MG/DL (ref 40–60)
HGB BLD-MCNC: 15.4 G/DL (ref 13–17.7)
IMM GRANULOCYTES # BLD AUTO: 0.02 10*3/MM3 (ref 0–0.05)
IMM GRANULOCYTES NFR BLD AUTO: 0.2 % (ref 0–0.5)
LDLC SERPL CALC-MCNC: 163 MG/DL (ref 0–100)
LDLC/HDLC SERPL: 4.35 {RATIO}
LYMPHOCYTES # BLD AUTO: 2.2 10*3/MM3 (ref 0.7–3.1)
LYMPHOCYTES NFR BLD AUTO: 25.6 % (ref 19.6–45.3)
MCH RBC QN AUTO: 29.1 PG (ref 26.6–33)
MCHC RBC AUTO-ENTMCNC: 33.1 G/DL (ref 31.5–35.7)
MCV RBC AUTO: 87.9 FL (ref 79–97)
MONOCYTES # BLD AUTO: 0.64 10*3/MM3 (ref 0.1–0.9)
MONOCYTES NFR BLD AUTO: 7.4 % (ref 5–12)
NEUTROPHILS NFR BLD AUTO: 5.41 10*3/MM3 (ref 1.7–7)
NEUTROPHILS NFR BLD AUTO: 62.9 % (ref 42.7–76)
PLATELET # BLD AUTO: 258 10*3/MM3 (ref 140–450)
PMV BLD AUTO: 9.8 FL (ref 6–12)
POTASSIUM SERPL-SCNC: 4.4 MMOL/L (ref 3.5–5.2)
PROT SERPL-MCNC: 6.7 G/DL (ref 6–8.5)
RBC # BLD AUTO: 5.29 10*6/MM3 (ref 4.14–5.8)
SODIUM SERPL-SCNC: 141 MMOL/L (ref 136–145)
T4 FREE SERPL-MCNC: 1.3 NG/DL (ref 0.93–1.7)
TRIGL SERPL-MCNC: 110 MG/DL (ref 0–150)
TSH SERPL DL<=0.05 MIU/L-ACNC: 1.78 UIU/ML (ref 0.27–4.2)
VLDLC SERPL-MCNC: 20 MG/DL (ref 5–40)
WBC NRBC COR # BLD AUTO: 8.61 10*3/MM3 (ref 3.4–10.8)

## 2024-04-02 PROCEDURE — 80050 GENERAL HEALTH PANEL: CPT | Performed by: FAMILY MEDICINE

## 2024-04-02 PROCEDURE — A9577 INJ MULTIHANCE: HCPCS | Performed by: FAMILY MEDICINE

## 2024-04-02 PROCEDURE — 84439 ASSAY OF FREE THYROXINE: CPT | Performed by: FAMILY MEDICINE

## 2024-04-02 PROCEDURE — 82105 ALPHA-FETOPROTEIN SERUM: CPT

## 2024-04-02 PROCEDURE — 74183 MRI ABD W/O CNTR FLWD CNTR: CPT

## 2024-04-02 PROCEDURE — 0 GADOBENATE DIMEGLUMINE 529 MG/ML SOLUTION: Performed by: FAMILY MEDICINE

## 2024-04-02 PROCEDURE — 36415 COLL VENOUS BLD VENIPUNCTURE: CPT

## 2024-04-02 PROCEDURE — 80061 LIPID PANEL: CPT | Performed by: FAMILY MEDICINE

## 2024-04-02 RX ADMIN — GADOBENATE DIMEGLUMINE 20 ML: 529 INJECTION, SOLUTION INTRAVENOUS at 09:02

## 2024-04-03 ENCOUNTER — TELEPHONE (OUTPATIENT)
Dept: GASTROENTEROLOGY | Facility: CLINIC | Age: 34
End: 2024-04-03
Payer: COMMERCIAL

## 2024-04-03 NOTE — TELEPHONE ENCOUNTER
Attempted to contact patient to go over results. No answer, LVM for patient to contact the office, I also stated I would send a gamigo message to patient.

## 2024-04-03 NOTE — TELEPHONE ENCOUNTER
Hub staff attempted to follow warm transfer process and was unsuccessful     Caller: ALIYA ESPARZA    Relationship to patient: SELF    Best call back number: [phone]    Patient is needing: CALLING BACK ABDOULAYE FOR MRI RESULTS

## 2024-04-03 NOTE — TELEPHONE ENCOUNTER
----- Message from JATINDER Gonzalez sent at 4/2/2024  4:30 PM EDT -----  I have reviewed the patient's most recent MRI which shows benign hepatic hemangiomas.  aFP tumor marker is negative.  Liver enzymes normal.  Overall benign findings.

## 2024-04-09 ENCOUNTER — OFFICE VISIT (OUTPATIENT)
Dept: FAMILY MEDICINE CLINIC | Facility: CLINIC | Age: 34
End: 2024-04-09
Payer: COMMERCIAL

## 2024-04-09 VITALS
OXYGEN SATURATION: 97 % | HEART RATE: 80 BPM | BODY MASS INDEX: 43.24 KG/M2 | WEIGHT: 315 LBS | TEMPERATURE: 97.8 F | DIASTOLIC BLOOD PRESSURE: 84 MMHG | SYSTOLIC BLOOD PRESSURE: 136 MMHG

## 2024-04-09 DIAGNOSIS — D18.03 LIVER HEMANGIOMA: ICD-10-CM

## 2024-04-09 DIAGNOSIS — D68.51 FACTOR 5 LEIDEN MUTATION, HETEROZYGOUS: Primary | ICD-10-CM

## 2024-04-09 LAB
INR PPP: 1.52 (ref 0.86–1.15)
PROTHROMBIN TIME: 18.6 SECONDS (ref 11.8–14.9)

## 2024-04-09 PROCEDURE — 99213 OFFICE O/P EST LOW 20 MIN: CPT | Performed by: FAMILY MEDICINE

## 2024-04-09 PROCEDURE — 85610 PROTHROMBIN TIME: CPT | Performed by: FAMILY MEDICINE

## 2024-04-09 NOTE — PROGRESS NOTES
Venipuncture Blood Specimen Collection  Venipuncture performed in left arm  by Arminda Doty with good hemostasis. Patient tolerated the procedure well without complications.   04/09/24   Arminda Doty

## 2024-04-09 NOTE — PROGRESS NOTES
Chief Complaint  liver mass and Hyperlipidemia    Subjective          Morris Pham presents to [facility]  History of Present Illness  Pt seeing GI for possible IBS and diarrhea  Pt had MRI liver that showed benign liver lesions  Hyperlipidemia  This is a chronic problem. The current episode started more than 1 year ago. The problem is uncontrolled. Recent lipid tests were reviewed and are variable. He has no history of chronic renal disease, diabetes, hypothyroidism, liver disease, obesity or nephrotic syndrome. There are no known factors aggravating his hyperlipidemia. Pertinent negatives include no chest pain, focal sensory loss, focal weakness, leg pain, myalgias or shortness of breath. Current antihyperlipidemic treatment includes diet change. The current treatment provides mild improvement of lipids. Risk factors for coronary artery disease include dyslipidemia and male sex.       Class 3 Severe Obesity (BMI >=40). Obesity-related health conditions include the following: hypertension and dyslipidemias. Obesity is improving with lifestyle modifications. BMI is is above average; BMI management plan is completed. We discussed portion control and increasing exercise.       Objective   No Known Allergies  Immunization History   Administered Date(s) Administered    COVID-19 (PFIZER) Purple Cap Monovalent 04/14/2021, 05/05/2021     History reviewed. No pertinent past medical history.   History reviewed. No pertinent surgical history.   Social History     Socioeconomic History    Marital status:    Tobacco Use    Smoking status: Never     Passive exposure: Never    Smokeless tobacco: Never   Vaping Use    Vaping status: Never Used   Substance and Sexual Activity    Alcohol use: Never    Drug use: Never    Sexual activity: Defer        Current Outpatient Medications:     warfarin (COUMADIN) 1 MG tablet, See Admin Instructions., Disp: , Rfl:     warfarin (COUMADIN) 10 MG  tablet, Take 1 tablet by mouth Daily., Disp: , Rfl:    Family History   Problem Relation Age of Onset    Colon cancer Neg Hx           Vital Signs:   Vitals:    04/09/24 0920   BP: 136/84   Pulse: 80   Temp: 97.8 °F (36.6 °C)   SpO2: 97%   Weight: (!) 155 kg (341 lb 3.2 oz)       Review of Systems   Constitutional:  Negative for fatigue and fever.   HENT:  Negative for sore throat.    Eyes:  Negative for visual disturbance.   Respiratory:  Negative for cough, chest tightness, shortness of breath and wheezing.    Cardiovascular:  Negative for chest pain, palpitations and leg swelling.   Gastrointestinal:  Negative for abdominal pain, diarrhea, nausea and vomiting.   Musculoskeletal:  Negative for myalgias.   Neurological:  Negative for focal weakness, light-headedness and headaches.      Physical Exam  Vitals reviewed.   Constitutional:       Appearance: Normal appearance. He is well-developed.   HENT:      Head: Normocephalic and atraumatic.      Right Ear: External ear normal.      Left Ear: External ear normal.      Mouth/Throat:      Pharynx: No oropharyngeal exudate.   Eyes:      Conjunctiva/sclera: Conjunctivae normal.      Pupils: Pupils are equal, round, and reactive to light.   Cardiovascular:      Rate and Rhythm: Normal rate and regular rhythm.      Pulses: Normal pulses.      Heart sounds: Normal heart sounds. No murmur heard.     No friction rub. No gallop.   Pulmonary:      Effort: Pulmonary effort is normal.      Breath sounds: Normal breath sounds. No wheezing or rhonchi.   Abdominal:      General: Abdomen is flat. Bowel sounds are normal. There is no distension.      Palpations: Abdomen is soft. There is no mass.      Tenderness: There is no abdominal tenderness. There is no guarding or rebound.      Hernia: No hernia is present.   Musculoskeletal:         General: Normal range of motion.   Skin:     General: Skin is warm and dry.      Capillary Refill: Capillary refill takes less than 2 seconds.    Neurological:      General: No focal deficit present.      Mental Status: He is alert and oriented to person, place, and time.      Cranial Nerves: No cranial nerve deficit.   Psychiatric:         Mood and Affect: Mood and affect normal.         Behavior: Behavior normal.         Thought Content: Thought content normal.         Judgment: Judgment normal.        Result Review :   The following data was reviewed by: Elliott Vidal MD on 04/09/2024:  CMP          4/2/2024    08:05   CMP   Glucose 97    BUN 15    Creatinine 0.99    EGFR 102.5    Sodium 141    Potassium 4.4    Chloride 106    Calcium 9.0    Total Protein 6.7    Albumin 4.3    Globulin 2.4    Total Bilirubin 0.4    Alkaline Phosphatase 85    AST (SGOT) 22    ALT (SGPT) 26    Albumin/Globulin Ratio 1.8    BUN/Creatinine Ratio 15.2    Anion Gap 9.5      CBC          11/8/2023    16:15 4/2/2024    08:05   CBC   WBC 8.52     8.61    RBC 5.19     5.29    Hemoglobin 15.4     15.4    Hematocrit 44.8     46.5    MCV 86.3     87.9    MCH 29.7     29.1    MCHC 34.4     33.1    RDW Test Not Performed     12.8    Platelets 262     258       Details          This result is from an external source.             Lipid Panel          4/2/2024    08:05   Lipid Panel   Total Cholesterol 220    Triglycerides 110    HDL Cholesterol 37    VLDL Cholesterol 20    LDL Cholesterol  163    LDL/HDL Ratio 4.35      TSH          4/2/2024    08:05   TSH   TSH 1.780                Assessment and Plan    Diagnoses and all orders for this visit:    1. Factor 5 Leiden mutation, heterozygous (Primary)  -     Protime-INR    2. Liver hemangioma            Follow Up   Return in about 6 months (around 10/9/2024) for Recheck.  Patient was given instructions and counseling regarding his condition or for health maintenance advice. Please see specific information pulled into the AVS if appropriate.

## 2024-04-19 NOTE — PRE-PROCEDURE INSTRUCTIONS
Reminded of arrival time at  0830       , Entrance C of the [facility]. Instructed to bring or have a  over the age of 18 set up to drive you home the day of procedure.    Instructed on clear liquid diet the day before, nothing red or purple. Call with any questions about the prep or if in need of the prep.  Reminded them not to eat or drink anything am of procedure unless its a sip of water with medications.

## 2024-04-19 NOTE — PRE-PROCEDURE INSTRUCTIONS
Left message:   Reminded of arrival time at 0830    , Entrance C of the [facility] hospital. Instructed to bring or have a  over the age of 18 set up to drive you home the day of procedure.    Instructed on clear liquid diet the day before, nothing red or purple. Call with any questions about the prep or if in need of the prep.  Reminded them not to eat or drink anything am of procedure unless its a sip of water with medications.  Hold coumadin 5 days prior to procedure.

## 2024-04-23 ENCOUNTER — TELEPHONE (OUTPATIENT)
Dept: GASTROENTEROLOGY | Facility: CLINIC | Age: 34
End: 2024-04-23
Payer: COMMERCIAL

## 2024-04-23 RX ORDER — SODIUM, POTASSIUM,MAG SULFATES 17.5-3.13G
2 SOLUTION, RECONSTITUTED, ORAL ORAL TAKE AS DIRECTED
Qty: 177 ML | Refills: 0 | Status: ON HOLD | OUTPATIENT
Start: 2024-04-23 | End: 2024-04-26

## 2024-04-23 NOTE — TELEPHONE ENCOUNTER
Pt called back, confirmed scope appt.  Stated he does not have Rx prep. Will resend to  pharmacy.  Stated he has prep instructions.  Voiced  understanding. [email or web address]

## 2024-04-23 NOTE — TELEPHONE ENCOUNTER
Received email from [facility].. they were not able to reach pt..    I left detailed vm (ok per EDUAR) reminding pt of scheduled colonoscopy on 4.26.24, 8:30 am arrival time. Reminded of liquid diet the day prior.  Reminded of bowel prep and instructions.  Reminded to hold Coumadin 5 days prior.  Stated would send Mashert message too.  raya

## 2024-04-25 ENCOUNTER — ANESTHESIA EVENT (OUTPATIENT)
Dept: GASTROENTEROLOGY | Facility: HOSPITAL | Age: 34
End: 2024-04-25
Payer: COMMERCIAL

## 2024-04-25 NOTE — ANESTHESIA PREPROCEDURE EVALUATION
Anesthesia Evaluation     Patient summary reviewed and Nursing notes reviewed   NPO Solid Status: > 8 hours  NPO Liquid Status: > 2 hours           Airway   Mallampati: I  TM distance: >3 FB  Neck ROM: full  No difficulty expected  Dental - normal exam     Pulmonary - normal exam    breath sounds clear to auscultation  (+) pulmonary embolism,  Cardiovascular - normal exam  Exercise tolerance: good (4-7 METS)    PT is on anticoagulation therapy  Rhythm: regular  Rate: normal        Neuro/Psych  GI/Hepatic/Renal/Endo    (+) obesity, morbid obesity    Musculoskeletal     Abdominal    Substance History      OB/GYN          Other   blood dyscrasia (factor 5 leiden mutation),     ROS/Med Hx Other: Last dose coumadin 4/21  PT /INR?                   Anesthesia Plan    ASA 3     general   total IV anesthesia  (Total IV Anesthesia    Patient understands anesthesia not responsible for dental damage.  )  intravenous induction     Anesthetic plan, risks, benefits, and alternatives have been provided, discussed and informed consent has been obtained with: patient.  Pre-procedure education provided  Plan discussed with CRNA.      CODE STATUS:

## 2024-04-26 ENCOUNTER — HOSPITAL ENCOUNTER (OUTPATIENT)
Facility: HOSPITAL | Age: 34
Setting detail: HOSPITAL OUTPATIENT SURGERY
Discharge: HOME | End: 2024-04-26
Attending: INTERNAL MEDICINE | Admitting: INTERNAL MEDICINE
Payer: COMMERCIAL

## 2024-04-26 ENCOUNTER — ANESTHESIA (OUTPATIENT)
Dept: GASTROENTEROLOGY | Facility: HOSPITAL | Age: 34
End: 2024-04-26
Payer: COMMERCIAL

## 2024-04-26 VITALS
RESPIRATION RATE: 24 BRPM | SYSTOLIC BLOOD PRESSURE: 125 MMHG | WEIGHT: 315 LBS | BODY MASS INDEX: 42.13 KG/M2 | HEART RATE: 64 BPM | TEMPERATURE: 98 F | DIASTOLIC BLOOD PRESSURE: 78 MMHG | OXYGEN SATURATION: 97 %

## 2024-04-26 DIAGNOSIS — R19.7 DIARRHEA, UNSPECIFIED TYPE: ICD-10-CM

## 2024-04-26 LAB
IGA1 MFR SER: 70 MG/DL (ref 70–400)
INR PPP: 1.09 (ref 0.86–1.15)
PROTHROMBIN TIME: 14.4 SECONDS (ref 11.8–14.9)

## 2024-04-26 PROCEDURE — 82784 ASSAY IGA/IGD/IGG/IGM EACH: CPT | Performed by: INTERNAL MEDICINE

## 2024-04-26 PROCEDURE — 88305 TISSUE EXAM BY PATHOLOGIST: CPT | Performed by: INTERNAL MEDICINE

## 2024-04-26 PROCEDURE — 86364 TISS TRNSGLTMNASE EA IG CLAS: CPT | Performed by: INTERNAL MEDICINE

## 2024-04-26 PROCEDURE — 25810000003 LACTATED RINGERS PER 1000 ML

## 2024-04-26 PROCEDURE — 45380 COLONOSCOPY AND BIOPSY: CPT | Performed by: INTERNAL MEDICINE

## 2024-04-26 PROCEDURE — 45385 COLONOSCOPY W/LESION REMOVAL: CPT | Performed by: INTERNAL MEDICINE

## 2024-04-26 PROCEDURE — 25010000002 PROPOFOL 10 MG/ML EMULSION

## 2024-04-26 PROCEDURE — 85610 PROTHROMBIN TIME: CPT

## 2024-04-26 DEVICE — DEV CLIP ENDO RESOLUTION360 CONTRL ROT 235CM: Type: IMPLANTABLE DEVICE | Site: DESCENDING COLON | Status: FUNCTIONAL

## 2024-04-26 RX ORDER — LIDOCAINE HYDROCHLORIDE 20 MG/ML
INJECTION, SOLUTION EPIDURAL; INFILTRATION; INTRACAUDAL; PERINEURAL AS NEEDED
Status: DISCONTINUED | OUTPATIENT
Start: 2024-04-26 | End: 2024-04-26 | Stop reason: SURG

## 2024-04-26 RX ORDER — SODIUM CHLORIDE, SODIUM LACTATE, POTASSIUM CHLORIDE, CALCIUM CHLORIDE 600; 310; 30; 20 MG/100ML; MG/100ML; MG/100ML; MG/100ML
30 INJECTION, SOLUTION INTRAVENOUS CONTINUOUS
Status: DISCONTINUED | OUTPATIENT
Start: 2024-04-26 | End: 2024-04-26 | Stop reason: HOSPADM

## 2024-04-26 RX ORDER — PROPOFOL 10 MG/ML
VIAL (ML) INTRAVENOUS AS NEEDED
Status: DISCONTINUED | OUTPATIENT
Start: 2024-04-26 | End: 2024-04-26 | Stop reason: SURG

## 2024-04-26 RX ADMIN — PROPOFOL 160 MCG/KG/MIN: 10 INJECTION, EMULSION INTRAVENOUS at 10:33

## 2024-04-26 RX ADMIN — PROPOFOL 30 MG: 10 INJECTION, EMULSION INTRAVENOUS at 10:35

## 2024-04-26 RX ADMIN — LIDOCAINE HYDROCHLORIDE 100 MG: 20 INJECTION, SOLUTION EPIDURAL; INFILTRATION; INTRACAUDAL; PERINEURAL at 10:33

## 2024-04-26 RX ADMIN — SODIUM CHLORIDE, POTASSIUM CHLORIDE, SODIUM LACTATE AND CALCIUM CHLORIDE 30 ML/HR: 600; 310; 30; 20 INJECTION, SOLUTION INTRAVENOUS at 09:48

## 2024-04-26 RX ADMIN — PROPOFOL 100 MG: 10 INJECTION, EMULSION INTRAVENOUS at 10:33

## 2024-04-26 RX ADMIN — SODIUM CHLORIDE, POTASSIUM CHLORIDE, SODIUM LACTATE AND CALCIUM CHLORIDE: 600; 310; 30; 20 INJECTION, SOLUTION INTRAVENOUS at 10:31

## 2024-04-26 NOTE — ANESTHESIA POSTPROCEDURE EVALUATION
Patient: Morris Pham    Procedure Summary       Date: 04/26/24 Room / Location: [facility] ENDOSCOPY 2 / [facility] ENDOSCOPY    Anesthesia Start: 1031 Anesthesia Stop: 1100    Procedure: COLONOSCOPY WITH HOT SNARE POLYPECTOMY, CLIP APPLICATION x1 Diagnosis:       Diarrhea, unspecified type      (Diarrhea, unspecified type [R19.7])    Surgeons: Geronimo Esparza MD Provider: Car Amaro CRNA    Anesthesia Type: general ASA Status: 3            Anesthesia Type: general    Vitals  Vitals Value Taken Time   /78 04/26/24 1118   Temp 36.7 °C (98 °F) 04/26/24 1118   Pulse 82 04/26/24 1123   Resp 24 04/26/24 1118   SpO2 96 % 04/26/24 1123   Vitals shown include unfiled device data.        Post Anesthesia Care and Evaluation    Post-procedure mental status: acceptable.  Pain management: satisfactory to patient    Airway patency: patent  Anesthetic complications: No anesthetic complications    Cardiovascular status: acceptable  Respiratory status: acceptable    Comments: Per chart review

## 2024-04-26 NOTE — H&P
Pre Procedure History & Physical    Chief Complaint:   diarrhea    Subjective     HPI:   As above    Past Medical History:   Past Medical History:   Diagnosis Date    Aortic dissection     2004    Factor 5 Leiden mutation, heterozygous     Pulmonary embolism        Past Surgical History:  History reviewed. No pertinent surgical history.    Family History:  Family History   Problem Relation Age of Onset    Colon cancer Neg Hx        Social History:   reports that he has never smoked. He has never been exposed to tobacco smoke. He has never used smokeless tobacco. He reports that he does not drink alcohol and does not use drugs.    Medications:   Medications Prior to Admission   Medication Sig Dispense Refill Last Dose    warfarin (COUMADIN) 1 MG tablet See Admin Instructions.   4/21/2024    warfarin (COUMADIN) 10 MG tablet Take 1 tablet by mouth Daily.   4/21/2024       Allergies:  Patient has no known allergies.        Objective     Blood pressure 126/80, pulse 69, temperature 97.6 °F (36.4 °C), temperature source Temporal, weight (!) 151 kg (332 lb 7.3 oz), SpO2 98%.    Physical Exam   Constitutional: Pt is oriented to person, place, and time and well-developed, well-nourished, and in no distress.   Mouth/Throat: Oropharynx is clear and moist.   Neck: Normal range of motion.   Cardiovascular: Normal rate, regular rhythm and normal heart sounds.    Pulmonary/Chest: Effort normal and breath sounds normal.   Abdominal: Soft. Nontender  Skin: Skin is warm and dry.   Psychiatric: Mood, memory, affect and judgment normal.     Assessment & Plan     Diagnosis:  diarrhea    Anticipated Surgical Procedure:  colonoscopy    The risks, benefits, and alternatives of this procedure have been discussed with the patient or the responsible party- the patient understands and agrees to proceed.

## 2024-04-27 LAB — TTG IGA SER-ACNC: <2 U/ML (ref 0–3)

## 2024-04-29 LAB
CYTO UR: NORMAL
LAB AP CASE REPORT: NORMAL
LAB AP CLINICAL INFORMATION: NORMAL
PATH REPORT.FINAL DX SPEC: NORMAL
PATH REPORT.GROSS SPEC: NORMAL

## 2024-08-27 ENCOUNTER — OFFICE VISIT (OUTPATIENT)
Dept: FAMILY MEDICINE CLINIC | Facility: CLINIC | Age: 34
End: 2024-08-27
Payer: COMMERCIAL

## 2024-08-27 VITALS
OXYGEN SATURATION: 96 % | BODY MASS INDEX: 42.59 KG/M2 | WEIGHT: 315 LBS | SYSTOLIC BLOOD PRESSURE: 140 MMHG | DIASTOLIC BLOOD PRESSURE: 80 MMHG | HEART RATE: 106 BPM | TEMPERATURE: 98 F

## 2024-08-27 DIAGNOSIS — R05.2 SUBACUTE COUGH: ICD-10-CM

## 2024-08-27 DIAGNOSIS — J01.40 ACUTE NON-RECURRENT PANSINUSITIS: Primary | ICD-10-CM

## 2024-08-27 PROCEDURE — 99213 OFFICE O/P EST LOW 20 MIN: CPT | Performed by: NURSE PRACTITIONER

## 2024-08-27 RX ORDER — BENZONATATE 100 MG/1
100 CAPSULE ORAL 3 TIMES DAILY PRN
Qty: 30 CAPSULE | Refills: 0 | Status: SHIPPED | OUTPATIENT
Start: 2024-08-27

## 2024-08-27 RX ORDER — DOXYCYCLINE 100 MG/1
100 CAPSULE ORAL 2 TIMES DAILY
Qty: 20 CAPSULE | Refills: 0 | Status: CANCELLED | OUTPATIENT
Start: 2024-08-27

## 2024-08-27 RX ORDER — RIVAROXABAN 10 MG/1
10 TABLET, FILM COATED ORAL
COMMUNITY

## 2024-08-27 RX ORDER — PREDNISONE 5 MG/1
TABLET ORAL
Qty: 1 EACH | Refills: 0 | Status: SHIPPED | OUTPATIENT
Start: 2024-08-27

## 2024-08-27 RX ORDER — AZITHROMYCIN 250 MG/1
TABLET, FILM COATED ORAL
Qty: 6 TABLET | Refills: 0 | Status: SHIPPED | OUTPATIENT
Start: 2024-08-27

## 2024-08-27 NOTE — PROGRESS NOTES
Chief Complaint  Sinus Problem (Cough, chest congestions x 3-4 weeks.  )    PHQ-2 Total Score: 0       History of Present Illness  Morris Pham is a 34 y.o. male who presents to [facility] with a past medical history of  Past Medical History:   Diagnosis Date    Aortic dissection     2004    Factor 5 Leiden mutation, heterozygous     Pulmonary embolism        HPI  Patient presents to the office today for sinus congestion, drainage, with cough for 3-4 weeks. Pt states symptoms would wax and wane with current worsening with thick green mucous. This morning noted a cough with chest discomfort. Coughing up green mucous for about 1-1.5 weeks.     Objective   Vital Signs:   Vitals:    08/27/24 1049   BP: 140/80   BP Location: Left arm   Patient Position: Sitting   Pulse: 106   Temp: 98 °F (36.7 °C)   TempSrc: Temporal   SpO2: 96%   Weight: (!) 152 kg (336 lb 1.6 oz)   PainSc: 0-No pain     Body mass index is 42.59 kg/m².    Wt Readings from Last 3 Encounters:   08/27/24 (!) 152 kg (336 lb 1.6 oz)   04/26/24 (!) 151 kg (332 lb 7.3 oz)   04/09/24 (!) 155 kg (341 lb 3.2 oz)     BP Readings from Last 3 Encounters:   08/27/24 140/80   04/26/24 125/78   04/09/24 136/84       Health Maintenance   Topic Date Due    Pneumococcal Vaccine 0-64 (1 of 2 - PCV) Never done    COVID-19 Vaccine (3 - 2023-24 season) 09/01/2023    HEPATITIS C SCREENING  Never done    INFLUENZA VACCINE  08/01/2024    TDAP/TD VACCINES (1 - Tdap) 04/09/2025 (Originally 6/20/2008)    ANNUAL PHYSICAL  03/18/2025    LIPID PANEL  04/02/2025    BMI FOLLOWUP  04/09/2025    COLORECTAL CANCER SCREENING  04/26/2027       Physical Exam  Vitals reviewed.   Constitutional:       General: He is not in acute distress.     Appearance: Normal appearance. He is well-developed.   HENT:      Head: Normocephalic and atraumatic.   Eyes:      Conjunctiva/sclera: Conjunctivae normal.      Pupils: Pupils are equal, round, and reactive to light.    Cardiovascular:      Rate and Rhythm: Normal rate and regular rhythm.      Heart sounds: Normal heart sounds.   Pulmonary:      Effort: Pulmonary effort is normal.      Breath sounds: Decreased air movement present. Examination of the right-lower field reveals decreased breath sounds. Examination of the left-lower field reveals decreased breath sounds. Decreased breath sounds present.   Musculoskeletal:      Cervical back: Neck supple.      Right lower leg: No edema.      Left lower leg: No edema.   Skin:     General: Skin is warm and dry.   Neurological:      Mental Status: He is alert and oriented to person, place, and time.   Psychiatric:         Mood and Affect: Mood and affect normal.         Behavior: Behavior normal.         Thought Content: Thought content normal.         Judgment: Judgment normal.            Result Review :  The following data was reviewed by: JATINDER Small on 08/27/2024:  XR Chest PA & Lateral (In Office) (08/27/2024 11:43)     Procedures        Assessment and Plan   Diagnoses and all orders for this visit:    1. Acute non-recurrent pansinusitis (Primary)  -     azithromycin (Zithromax Z-Santos) 250 MG tablet; Take 2 tablets by mouth on day 1, then 1 tablet daily on days 2-5  Dispense: 6 tablet; Refill: 0  -     predniSONE 5 MG (21) tablet therapy pack dose pack; Take as directed on package instructions.  Dispense: 1 each; Refill: 0    2. Subacute cough  -     XR Chest PA & Lateral (In Office)  -     benzonatate (Tessalon Perles) 100 MG capsule; Take 1 capsule by mouth 3 (Three) Times a Day As Needed for Cough.  Dispense: 30 capsule; Refill: 0      FOLLOW UP  No follow-ups on file.    Patient was given instructions and counseling regarding his condition or for health maintenance advice. Please see specific information pulled into the AVS if appropriate.       JATINDER Small  08/27/24  12:19 EDT    CURRENT & DISCONTINUED MEDICATIONS  Current Outpatient Medications    Medication Instructions    azithromycin (Zithromax Z-Santos) 250 MG tablet Take 2 tablets by mouth on day 1, then 1 tablet daily on days 2-5    benzonatate (TESSALON PERLES) 100 mg, Oral, 3 Times Daily PRN    predniSONE 5 MG (21) tablet therapy pack dose pack Take as directed on package instructions.    Xarelto 10 mg, Oral, Daily With Dinner       Medications Discontinued During This Encounter   Medication Reason    warfarin (COUMADIN) 1 MG tablet *Therapy completed    warfarin (COUMADIN) 10 MG tablet *Therapy completed

## (undated) DEVICE — CONN JET HYDRA H20 AUXILIARY DISP

## (undated) DEVICE — SINGLE-USE BIOPSY FORCEPS: Brand: RADIAL JAW 4

## (undated) DEVICE — SNAR POLYP CAPTIFLEX XS/OVL 11X2.4MM 240CM 1P/U

## (undated) DEVICE — Device

## (undated) DEVICE — Device: Brand: DEFENDO AIR/WATER/SUCTION AND BIOPSY VALVE

## (undated) DEVICE — SOLIDIFIER LIQLOC PLS 1500CC BT

## (undated) DEVICE — THE SINGLE USE ETRAP – POLYP TRAP IS USED FOR SUCTION RETRIEVAL OF ENDOSCOPICALLY REMOVED POLYPS.: Brand: ETRAP

## (undated) DEVICE — SOL IRRG H2O PL/BG 1000ML STRL

## (undated) DEVICE — LINER SURG CANSTR SXN S/RIGD 1500CC